# Patient Record
Sex: FEMALE | Race: WHITE
[De-identification: names, ages, dates, MRNs, and addresses within clinical notes are randomized per-mention and may not be internally consistent; named-entity substitution may affect disease eponyms.]

---

## 2021-05-20 ENCOUNTER — HOSPITAL ENCOUNTER (OUTPATIENT)
Dept: HOSPITAL 75 - RAD | Age: 86
End: 2021-05-20
Attending: NURSE PRACTITIONER
Payer: MEDICARE

## 2021-05-20 DIAGNOSIS — R06.02: Primary | ICD-10-CM

## 2021-05-20 PROCEDURE — 71046 X-RAY EXAM CHEST 2 VIEWS: CPT

## 2021-05-20 NOTE — DIAGNOSTIC IMAGING REPORT
INDICATION: Shortness of air.



TIME OF EXAM: 2:53 p.m.



COMPARISON: Prior chest from 04/27/2007.



FINDINGS: Heart size is stable. There are interstitial changes

throughout both lungs likely owing to interstitial fibrosis. A

more confluent opacity in the right mid lung field is noted and

superimposed infiltrate cannot be entirely excluded. There is no

effusion or pneumothorax identified.



IMPRESSION: Probable interstitial fibrosis, however, there may be

some superimposed pneumonia in the right mid lung. Follow-up

after course of therapy is recommended. 



Dictated by: 



  Dictated on workstation # [id number]

## 2021-07-15 ENCOUNTER — HOSPITAL ENCOUNTER (OUTPATIENT)
Dept: HOSPITAL 75 - RT | Age: 86
End: 2021-07-15
Attending: NURSE PRACTITIONER
Payer: MEDICARE

## 2021-07-15 DIAGNOSIS — N20.0: ICD-10-CM

## 2021-07-15 DIAGNOSIS — J43.9: Primary | ICD-10-CM

## 2021-07-15 LAB
BUN/CREAT SERPL: 14
CREAT SERPL-MCNC: 0.95 MG/DL (ref 0.6–1.3)
GFR SERPLBLD BASED ON 1.73 SQ M-ARVRAT: 56 ML/MIN

## 2021-07-15 PROCEDURE — 84520 ASSAY OF UREA NITROGEN: CPT

## 2021-07-15 PROCEDURE — 82565 ASSAY OF CREATININE: CPT

## 2021-07-15 PROCEDURE — 36415 COLL VENOUS BLD VENIPUNCTURE: CPT

## 2021-07-15 PROCEDURE — 71260 CT THORAX DX C+: CPT

## 2021-07-15 NOTE — DIAGNOSTIC IMAGING REPORT
PROCEDURE: CT chest with contrast only.



TECHNIQUE: Multiple contiguous axial images were obtained through

the chest after administration of intravenous contrast. Auto

Exposure Controls were utilized during the CT exam to meet ALARA

standards for radiation dose reduction. 



INDICATION:  COPD, shortness of air with exertion 



Correlation limited to plain films most recently 05/20/2021. 



No prior CT.



FINDINGS: 

Tortuous thoracic aorta is nonaneurysmal, patent and nonacute. No

identifiable pulmonary arterial filling defect. Patient has

severe fibroemphysematous disease with largely subpleural

scarring involving  lower greater than upper   lobes. No evidence

for pneumonia superimposed. There is tracheal bronchomegaly.

There is no blebs bullous disease or air cysts. No soft tissue

density lung mass. No thoracic lymphadenopathy. There are tiny

pleural effusions greater left without evidence for their

loculation. There is a cachectic body habitus with no acute soft

tissue or osseous chest wall pathology. There is  advanced

hypertrophic thoracolumbar spondylosis with no acute bony

abnormality. Visualized upper abdomen shows large calculus within

the right upper pole calyx measuring 7 mm. No acute appearing

upper abdominal abnormality.



IMPRESSION: 

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

finding. No finding felt suggestive of superimposed pneumonia. 



No acute vascular pathology.



Tiny nonloculated pleural effusions with no suspicious lymph

nodes or lung mass.  



Nonobstructing right upper pole nephrolithiasis.



Dictated by: 



  Dictated on workstation # [id number]